# Patient Record
Sex: FEMALE | Race: WHITE | Employment: PART TIME | URBAN - METROPOLITAN AREA
[De-identification: names, ages, dates, MRNs, and addresses within clinical notes are randomized per-mention and may not be internally consistent; named-entity substitution may affect disease eponyms.]

---

## 2023-09-26 ENCOUNTER — PROCEDURE VISIT (OUTPATIENT)
Dept: PHYSICAL MEDICINE AND REHAB | Age: 43
End: 2023-09-26
Payer: COMMERCIAL

## 2023-09-26 DIAGNOSIS — R20.2 PARESTHESIA AND PAIN OF BOTH UPPER EXTREMITIES: Primary | ICD-10-CM

## 2023-09-26 DIAGNOSIS — M79.601 PARESTHESIA AND PAIN OF BOTH UPPER EXTREMITIES: Primary | ICD-10-CM

## 2023-09-26 DIAGNOSIS — M79.602 PARESTHESIA AND PAIN OF BOTH UPPER EXTREMITIES: Primary | ICD-10-CM

## 2023-09-26 PROCEDURE — 95911 NRV CNDJ TEST 9-10 STUDIES: CPT | Performed by: PHYSICAL MEDICINE & REHABILITATION

## 2023-09-26 PROCEDURE — 95886 MUSC TEST DONE W/N TEST COMP: CPT | Performed by: PHYSICAL MEDICINE & REHABILITATION

## 2023-09-26 NOTE — PROGRESS NOTES
Deltoid   Normal None Normal      Biceps   Normal None Normal      Triceps   Normal None Normal      Pronator Teres Normal None Normal      Extensor Indicis Normal None Normal      1st Dorsal Interosseus Normal None Normal      ADM Normal None Normal      Abductor Pollicis Br. Normal None Normal          FINDINGS:   EMG of the cervical paraspinals and the right upper limb demonstrated no paraspinal nor limb muscle membrane irritability. Motor units were of normal configuration and recruitment throughout. Median and ulnar sensory and motor latencies, evoked amplitudes and nerve conduction velocities were generally well-maintained. There was a slightly diminished evoked amplitude of the right median compound motor action potential.  There was subtle ulnar motor conduction slowing about the right elbow. IMPRESSION:      1. Minimally abnormal EMG. There was nonspecific findings of a slightly reduced right median motor evoked amplitude and slight ulnar motor conduction slowing about the elbow. Neither of these are diagnostic by themselves and certainly do not explain her presenting symptoms. 2.  No evidence of a focal cervical spinal nerve root lesion (radiculopathy), plexopathy, generalized neuropathy or primary muscle disease.            Thank you for this interesting referral.